# Patient Record
Sex: MALE | Race: WHITE | URBAN - METROPOLITAN AREA
[De-identification: names, ages, dates, MRNs, and addresses within clinical notes are randomized per-mention and may not be internally consistent; named-entity substitution may affect disease eponyms.]

---
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
## 2025-02-07 ENCOUNTER — OFFICE VISIT (OUTPATIENT)
Dept: URGENT CARE | Facility: PHYSICIAN GROUP | Age: 20
End: 2025-02-07
Payer: COMMERCIAL

## 2025-02-07 VITALS
TEMPERATURE: 99.6 F | BODY MASS INDEX: 26.6 KG/M2 | SYSTOLIC BLOOD PRESSURE: 126 MMHG | HEART RATE: 85 BPM | HEIGHT: 71 IN | WEIGHT: 190 LBS | DIASTOLIC BLOOD PRESSURE: 86 MMHG | RESPIRATION RATE: 14 BRPM | OXYGEN SATURATION: 97 %

## 2025-02-07 DIAGNOSIS — J10.1 INFLUENZA A: ICD-10-CM

## 2025-02-07 DIAGNOSIS — R51.9 ACUTE NONINTRACTABLE HEADACHE, UNSPECIFIED HEADACHE TYPE: ICD-10-CM

## 2025-02-07 DIAGNOSIS — A74.9 CHLAMYDIA INFECTION: ICD-10-CM

## 2025-02-07 LAB
FLUAV RNA SPEC QL NAA+PROBE: POSITIVE
FLUBV RNA SPEC QL NAA+PROBE: NEGATIVE
RSV RNA SPEC QL NAA+PROBE: NEGATIVE
S PYO DNA SPEC NAA+PROBE: NOT DETECTED
SARS-COV-2 RNA RESP QL NAA+PROBE: NEGATIVE

## 2025-02-07 PROCEDURE — 99203 OFFICE O/P NEW LOW 30 MIN: CPT | Performed by: NURSE PRACTITIONER

## 2025-02-07 PROCEDURE — 0241U POCT CEPHEID COV-2, FLU A/B, RSV - PCR: CPT | Performed by: NURSE PRACTITIONER

## 2025-02-07 PROCEDURE — 87651 STREP A DNA AMP PROBE: CPT | Performed by: NURSE PRACTITIONER

## 2025-02-07 RX ORDER — DOXYCYCLINE HYCLATE 100 MG
100 TABLET ORAL EVERY 12 HOURS
Qty: 14 TABLET | Refills: 0 | Status: SHIPPED | OUTPATIENT
Start: 2025-02-07 | End: 2025-02-14

## 2025-02-07 RX ORDER — OSELTAMIVIR PHOSPHATE 75 MG/1
75 CAPSULE ORAL 2 TIMES DAILY
Qty: 10 CAPSULE | Refills: 0 | Status: SHIPPED | OUTPATIENT
Start: 2025-02-07 | End: 2025-02-12

## 2025-02-07 RX ORDER — IBUPROFEN 800 MG/1
800 TABLET, FILM COATED ORAL EVERY 8 HOURS PRN
Qty: 30 TABLET | Refills: 0 | Status: SHIPPED | OUTPATIENT
Start: 2025-02-07

## 2025-02-07 ASSESSMENT — VISUAL ACUITY: OU: 1

## 2025-02-08 NOTE — PROGRESS NOTES
Subjective:     Saman Marlow is a 19 y.o. male who presents for Body Aches (Headache, sore throat, congestion, chills /X 2 days )       URI   This is a new problem. Associated symptoms include congestion, headaches and a sore throat.     Ambient listening software ([employer]) used during this encounter with the consent of the patient. The following text is AI-assisted:    History of Present Illness  The patient presents with body aches, headache, ear congestion, sore throat, cough, and chlamydia.    Body Aches, Headache, Ear Congestion, Sore Throat, and Cough  Symptoms began suddenly on Wednesday after a day of normal health. He reports generalized body aches, headache (frontal and temporal), ear congestion, sore throat, nasal congestion, mild cough triggered by deep inhalation, joint pain, and weakness. No nausea, vomiting, or diarrhea. He has not taken ibuprofen, Advil, or Motrin. Self-medicating with TheraFlu and DayQuil has provided some relief.  - Onset: Symptoms began suddenly on Wednesday after a day of normal health.  - Location: Generalized body aches, headache (frontal and temporal), ear congestion, sore throat, nasal congestion, joint pain.  - Duration: Since Wednesday.  - Character: Generalized body aches, headache, ear congestion, sore throat, nasal congestion, mild cough triggered by deep inhalation, joint pain, and weakness.  - Alleviating/Aggravating Factors: Self-medicating with TheraFlu and DayQuil has provided some relief; has not taken ibuprofen, Advil, or Motrin.  - Timing: Symptoms began suddenly on Wednesday.  - Severity: Mild cough triggered by deep inhalation; no nausea, vomiting, or diarrhea.    Chlamydia  Positive chlamydia test from an outside lab on 02/04/2025 [verified with patient's outside EMR portal]. Currently asymptomatic and has not started treatment. His partner is getting treated.    MEDICATIONS  - TheraFlu  - DayQuil    Review of Systems   HENT:  Positive for congestion  "and sore throat.    Musculoskeletal:  Positive for myalgias.   Neurological:  Positive for headaches.   All other systems reviewed and are negative.  Refer to HPI for additional details.    During this visit, appropriate PPE was worn, and hand hygiene was performed.    PMH:  has no past medical history on file.    MEDS:   Current Outpatient Medications:     ibuprofen (MOTRIN) 800 MG Tab, Take 1 Tablet by mouth every 8 hours as needed for Moderate Pain, Headache or Fever., Disp: 30 Tablet, Rfl: 0    ALLERGIES: No Known Allergies  SURGHX: History reviewed. No pertinent surgical history.  SOCHX:  reports that he has never smoked. He has never used smokeless tobacco. He reports current alcohol use. He reports that he does not use drugs.    FH: Per HPI as applicable/pertinent.      Objective:     /86   Pulse 85   Temp 37.6 °C (99.6 °F) (Temporal)   Resp 14   Ht 1.803 m (5' 11\")   Wt 86.2 kg (190 lb)   SpO2 97%   BMI 26.50 kg/m²     Physical Exam  Nursing note reviewed.   Constitutional:       General: He is not in acute distress.     Appearance: He is well-developed. He is not ill-appearing or toxic-appearing.   HENT:      Right Ear: Tympanic membrane is bulging. Tympanic membrane is not perforated or erythematous.      Left Ear: Tympanic membrane is not perforated, erythematous or bulging.      Nose:      Right Sinus: Frontal sinus tenderness present.      Left Sinus: Frontal sinus tenderness present.      Mouth/Throat:      Mouth: Mucous membranes are moist.      Pharynx: Pharyngeal swelling and posterior oropharyngeal erythema present.   Eyes:      General: Vision grossly intact.      Extraocular Movements: Extraocular movements intact.      Conjunctiva/sclera: Conjunctivae normal.   Neck:      Trachea: Phonation normal.   Cardiovascular:      Rate and Rhythm: Normal rate and regular rhythm.      Heart sounds: Normal heart sounds.   Pulmonary:      Effort: Pulmonary effort is normal. No respiratory " distress.      Breath sounds: Normal breath sounds. No stridor. No decreased breath sounds, wheezing, rhonchi or rales.   Musculoskeletal:         General: No deformity. Normal range of motion.      Cervical back: Normal range of motion and neck supple.   Skin:     General: Skin is warm and dry.      Coloration: Skin is not pale.   Neurological:      Mental Status: He is alert and oriented to person, place, and time.      Motor: No weakness.   Psychiatric:         Behavior: Behavior normal. Behavior is cooperative.     POCT Cepheid CoV-2, Flu A/B, RSV by PCR: positive flu A    POCT Cepheid Group A Strep by PCR: negative    Results  - Laboratory Studies:    - Positive chlamydia test from an outside lab, 02/04/2025      Assessment/Plan:     1. Influenza A  - POCT CEPHEID COV-2, FLU A/B, RSV - PCR  - POCT CEPHEID GROUP A STREP - PCR  - oseltamivir (TAMIFLU) 75 MG Cap; Take 1 Capsule by mouth 2 times a day for 5 days.  Dispense: 10 Capsule; Refill: 0    2. Acute nonintractable headache, unspecified headache type  - ibuprofen (MOTRIN) 800 MG Tab; Take 1 Tablet by mouth every 8 hours as needed for Moderate Pain, Headache or Fever.  Dispense: 30 Tablet; Refill: 0    3. Chlamydia infection  - doxycycline (VIBRAMYCIN) 100 MG Tab; Take 1 Tablet by mouth every 12 hours for 7 days.  Dispense: 14 Tablet; Refill: 0    Phoned patient to discuss PCR swabs.    Discussed likely self-limiting viral etiology in regards to flu and expected course and duration of illness. Vital signs stable, afebrile, no acute distress at this time.    Emphasize supportive measures and symptom management with over-the-counter medication as needed. Rx as above sent electronically.    Will start patient on doxycycline. Advised to avoid sexual activity until all involved complete treatment and are asymptomatic.    Monitor. Warning signs reviewed. Return precautions advised.    Differential diagnosis, natural history, supportive care, over-the-counter  symptom management per 's instructions, close monitoring, and indications for immediate follow-up discussed.     All questions answered. Patient agrees with the plan of care.

## 2025-02-15 ASSESSMENT — ENCOUNTER SYMPTOMS
SORE THROAT: 1
MYALGIAS: 1
HEADACHES: 1